# Patient Record
Sex: MALE | Race: WHITE | HISPANIC OR LATINO | Employment: FULL TIME | URBAN - METROPOLITAN AREA
[De-identification: names, ages, dates, MRNs, and addresses within clinical notes are randomized per-mention and may not be internally consistent; named-entity substitution may affect disease eponyms.]

---

## 2022-01-18 ENCOUNTER — LAB VISIT (OUTPATIENT)
Dept: PRIMARY CARE CLINIC | Facility: CLINIC | Age: 37
End: 2022-01-18
Payer: COMMERCIAL

## 2022-01-18 DIAGNOSIS — Z20.822 CONTACT WITH AND (SUSPECTED) EXPOSURE TO COVID-19: ICD-10-CM

## 2022-01-18 LAB
CTP QC/QA: YES
SARS-COV-2 AG RESP QL IA.RAPID: NEGATIVE

## 2022-01-18 PROCEDURE — 87811 SARS-COV-2 COVID19 W/OPTIC: CPT

## 2022-02-02 ENCOUNTER — OFFICE VISIT (OUTPATIENT)
Dept: FAMILY MEDICINE | Facility: CLINIC | Age: 37
End: 2022-02-02
Payer: COMMERCIAL

## 2022-02-02 VITALS
HEART RATE: 66 BPM | DIASTOLIC BLOOD PRESSURE: 68 MMHG | HEIGHT: 75 IN | BODY MASS INDEX: 29.27 KG/M2 | OXYGEN SATURATION: 98 % | WEIGHT: 235.38 LBS | SYSTOLIC BLOOD PRESSURE: 124 MMHG

## 2022-02-02 DIAGNOSIS — Z00.00 GENERAL MEDICAL EXAM: Primary | ICD-10-CM

## 2022-02-02 DIAGNOSIS — C85.90 LYMPHOMA IN REMISSION: ICD-10-CM

## 2022-02-02 PROCEDURE — 1159F MED LIST DOCD IN RCRD: CPT | Mod: CPTII,S$GLB,, | Performed by: FAMILY MEDICINE

## 2022-02-02 PROCEDURE — 3074F PR MOST RECENT SYSTOLIC BLOOD PRESSURE < 130 MM HG: ICD-10-PCS | Mod: CPTII,S$GLB,, | Performed by: FAMILY MEDICINE

## 2022-02-02 PROCEDURE — 99999 PR PBB SHADOW E&M-EST. PATIENT-LVL IV: ICD-10-PCS | Mod: PBBFAC,,, | Performed by: FAMILY MEDICINE

## 2022-02-02 PROCEDURE — 1160F RVW MEDS BY RX/DR IN RCRD: CPT | Mod: CPTII,S$GLB,, | Performed by: FAMILY MEDICINE

## 2022-02-02 PROCEDURE — 99204 OFFICE O/P NEW MOD 45 MIN: CPT | Mod: S$GLB,,, | Performed by: FAMILY MEDICINE

## 2022-02-02 PROCEDURE — 99204 PR OFFICE/OUTPT VISIT, NEW, LEVL IV, 45-59 MIN: ICD-10-PCS | Mod: S$GLB,,, | Performed by: FAMILY MEDICINE

## 2022-02-02 PROCEDURE — 3078F PR MOST RECENT DIASTOLIC BLOOD PRESSURE < 80 MM HG: ICD-10-PCS | Mod: CPTII,S$GLB,, | Performed by: FAMILY MEDICINE

## 2022-02-02 PROCEDURE — 3074F SYST BP LT 130 MM HG: CPT | Mod: CPTII,S$GLB,, | Performed by: FAMILY MEDICINE

## 2022-02-02 PROCEDURE — 99999 PR PBB SHADOW E&M-EST. PATIENT-LVL IV: CPT | Mod: PBBFAC,,, | Performed by: FAMILY MEDICINE

## 2022-02-02 PROCEDURE — 1160F PR REVIEW ALL MEDS BY PRESCRIBER/CLIN PHARMACIST DOCUMENTED: ICD-10-PCS | Mod: CPTII,S$GLB,, | Performed by: FAMILY MEDICINE

## 2022-02-02 PROCEDURE — 3008F BODY MASS INDEX DOCD: CPT | Mod: CPTII,S$GLB,, | Performed by: FAMILY MEDICINE

## 2022-02-02 PROCEDURE — 3078F DIAST BP <80 MM HG: CPT | Mod: CPTII,S$GLB,, | Performed by: FAMILY MEDICINE

## 2022-02-02 PROCEDURE — 1159F PR MEDICATION LIST DOCUMENTED IN MEDICAL RECORD: ICD-10-PCS | Mod: CPTII,S$GLB,, | Performed by: FAMILY MEDICINE

## 2022-02-02 PROCEDURE — 3008F PR BODY MASS INDEX (BMI) DOCUMENTED: ICD-10-PCS | Mod: CPTII,S$GLB,, | Performed by: FAMILY MEDICINE

## 2022-02-02 RX ORDER — LEVOTHYROXINE SODIUM 75 UG/1
75 TABLET ORAL DAILY
COMMUNITY
End: 2022-02-02 | Stop reason: SDUPTHER

## 2022-02-02 RX ORDER — LEVOTHYROXINE SODIUM 75 UG/1
75 TABLET ORAL DAILY
Qty: 90 TABLET | Refills: 1 | Status: SHIPPED | OUTPATIENT
Start: 2022-02-02 | End: 2022-07-15 | Stop reason: SDUPTHER

## 2022-02-02 NOTE — PROGRESS NOTES
[facility] Note    Chief Complaint      Chief Complaint   Patient presents with    Annual Exam    thyroid check     History of Present Illness      Jaden Sepulveda is a 36 y.o. male who presents today with:    Patient is here for f/u on Hypothyroidism and med refills. He has not had bloodwork in over a year.      He also has hx of lymphoma and would like referral to heme/onc.  In remission.      Problem List Items Addressed This Visit    None     Visit Diagnoses     General medical exam    -  Primary    Relevant Orders    CBC Auto Differential    Comprehensive Metabolic Panel    Hemoglobin A1C    Lipid Panel    TSH    Urinalysis    T4, Free    Lymphoma in remission        Relevant Orders    Ambulatory referral/consult to Hematology / Oncology          Health Maintenance   Topic Date Due    Hepatitis C Screening  Never done    Lipid Panel  Never done    TETANUS VACCINE  Never done       History reviewed. No pertinent past medical history.    History reviewed. No pertinent surgical history.    family history is not on file.          Review of Systems   Constitutional: Negative for chills, fever, malaise/fatigue and weight loss.   HENT: Negative for congestion, ear discharge and ear pain.    Eyes: Negative for blurred vision.   Respiratory: Negative for cough and shortness of breath.    Cardiovascular: Negative for chest pain and leg swelling.   Gastrointestinal: Negative for abdominal pain, constipation, diarrhea and vomiting.   Genitourinary: Negative for dysuria.   Musculoskeletal: Negative for myalgias.   Skin: Negative for rash.   Neurological: Negative for dizziness, tingling, focal weakness, weakness and headaches.   Endo/Heme/Allergies: Does not bruise/bleed easily.   Psychiatric/Behavioral: Negative for depression and suicidal ideas.        Outpatient Encounter Medications as of 2/2/2022   Medication Sig Dispense Refill    [DISCONTINUED] levothyroxine (SYNTHROID) 75 MCG tablet Take  "75 mcg by mouth once daily.       No facility-administered encounter medications on file as of 2/2/2022.       Review of patient's allergies indicates:  No Known Allergies    Physical Exam      Vital Signs  Pulse: 66  SpO2: 98 %  BP: 124/68  Pain Score: 0-No pain  Height and Weight  Height: 6' 3" (190.5 cm)  Weight: 106.8 kg (235 lb 6.4 oz)  BSA (Calculated - sq m): 2.38 sq meters  BMI (Calculated): 29.4  Weight in (lb) to have BMI = 25: 199.6]    Physical Exam  Vitals reviewed.   Constitutional:       General: He is not in acute distress.     Appearance: Normal appearance. He is normal weight. He is not ill-appearing.   HENT:      Head: Normocephalic.      Right Ear: Tympanic membrane normal.      Left Ear: Tympanic membrane normal.      Nose: Nose normal.      Mouth/Throat:      Mouth: Mucous membranes are moist.      Pharynx: Oropharynx is clear.   Eyes:      Extraocular Movements: Extraocular movements intact.      Conjunctiva/sclera: Conjunctivae normal.      Pupils: Pupils are equal, round, and reactive to light.   Cardiovascular:      Rate and Rhythm: Normal rate and regular rhythm.      Pulses: Normal pulses.      Heart sounds: Normal heart sounds.   Pulmonary:      Effort: Pulmonary effort is normal.      Breath sounds: Normal breath sounds.   Abdominal:      General: Abdomen is flat. Bowel sounds are normal. There is no distension.      Palpations: Abdomen is soft.      Tenderness: There is no abdominal tenderness.   Musculoskeletal:         General: Normal range of motion.      Cervical back: Normal range of motion and neck supple.   Skin:     General: Skin is warm and dry.      Capillary Refill: Capillary refill takes less than 2 seconds.      Findings: No rash.   Neurological:      General: No focal deficit present.      Mental Status: He is alert and oriented to person, place, and time.      Motor: No weakness.      Gait: Gait normal.   Psychiatric:         Mood and Affect: Mood normal.         " Behavior: Behavior normal.         Thought Content: Thought content normal.         Judgment: Judgment normal.          Laboratory:  CBC:  No results for input(s): WBC, RBC, HGB, HCT, PLT, MCV, MCH, MCHC in the last 2160 hours.  CMP:  No results for input(s): GLU, CALCIUM, ALBUMIN, PROT, NA, K, CO2, CL, BUN, ALKPHOS, ALT, AST, BILITOT in the last 2160 hours.    Invalid input(s): CREATININ  URINALYSIS:  No results for input(s): COLORU, CLARITYU, SPECGRAV, PHUR, PROTEINUA, GLUCOSEU, BILIRUBINCON, BLOODU, WBCU, RBCU, BACTERIA, MUCUS, NITRITE, LEUKOCYTESUR, UROBILINOGEN, HYALINECASTS in the last 2160 hours.   LIPIDS:  No results for input(s): TSH, HDL, CHOL, TRIG, LDLCALC, CHOLHDL, NONHDLCHOL, TOTALCHOLEST in the last 2160 hours.  TSH:  No results for input(s): TSH in the last 2160 hours.  A1C:  No results for input(s): HGBA1C in the last 2160 hours.    Radiology:      Assessment/Plan     Jaden Sepulveda is a 36 y.o.male with:    1. General medical exam  - CBC Auto Differential; Future  - Comprehensive Metabolic Panel; Future  - Hemoglobin A1C; Future  - Lipid Panel; Future  - TSH; Future  - Urinalysis; Future  - T4, Free; Future    2. Lymphoma in remission  - Ambulatory referral/consult to Hematology / Oncology; Future  Chronic conditions stable.  Will continue to monitor.     Follow up as discussed    If symptoms worsen or do not improve return to clinic, go to Urgent Care or ER  Take Medications as directed      -Continue current medications and maintain follow up with specialists.         Donald W Fabacher Jr, MD [facility] [place]

## 2022-04-25 ENCOUNTER — PATIENT MESSAGE (OUTPATIENT)
Dept: FAMILY MEDICINE | Facility: CLINIC | Age: 37
End: 2022-04-25
Payer: COMMERCIAL

## 2022-04-25 NOTE — TELEPHONE ENCOUNTER
Pt would like a B12 lab added to his labs please advise. Pt needs all labs sent to [facility] he would like to get them done Wed.

## 2022-04-26 ENCOUNTER — TELEPHONE (OUTPATIENT)
Dept: FAMILY MEDICINE | Facility: CLINIC | Age: 37
End: 2022-04-26
Payer: COMMERCIAL

## 2022-04-26 DIAGNOSIS — Z00.00 GENERAL MEDICAL EXAM: Primary | ICD-10-CM

## 2022-04-28 LAB
ALBUMIN SERPL-MCNC: 4.4 G/DL (ref 3.6–5.1)
ALBUMIN/GLOB SERPL: 1.6 (CALC) (ref 1–2.5)
ALP SERPL-CCNC: 89 U/L (ref 36–130)
ALT SERPL-CCNC: 31 U/L (ref 9–46)
AST SERPL-CCNC: 24 U/L (ref 10–40)
BASOPHILS # BLD AUTO: 60 CELLS/UL (ref 0–200)
BASOPHILS NFR BLD AUTO: 1 %
BILIRUB SERPL-MCNC: 0.3 MG/DL (ref 0.2–1.2)
BUN SERPL-MCNC: 12 MG/DL (ref 7–25)
BUN/CREAT SERPL: NORMAL (CALC) (ref 6–22)
CALCIUM SERPL-MCNC: 9.7 MG/DL (ref 8.6–10.3)
CHLORIDE SERPL-SCNC: 105 MMOL/L (ref 98–110)
CHOLEST SERPL-MCNC: 164 MG/DL
CHOLEST/HDLC SERPL: 3.8 (CALC)
CO2 SERPL-SCNC: 28 MMOL/L (ref 20–32)
CREAT SERPL-MCNC: 0.85 MG/DL (ref 0.6–1.35)
EOSINOPHIL # BLD AUTO: 588 CELLS/UL (ref 15–500)
EOSINOPHIL NFR BLD AUTO: 9.8 %
ERYTHROCYTE [DISTWIDTH] IN BLOOD BY AUTOMATED COUNT: 13.2 % (ref 11–15)
GLOBULIN SER CALC-MCNC: 2.8 G/DL (CALC) (ref 1.9–3.7)
GLUCOSE SERPL-MCNC: 98 MG/DL (ref 65–99)
HBA1C MFR BLD: 5.1 % OF TOTAL HGB
HCT VFR BLD AUTO: 43.8 % (ref 38.5–50)
HDLC SERPL-MCNC: 43 MG/DL
HGB BLD-MCNC: 13.9 G/DL (ref 13.2–17.1)
LDLC SERPL CALC-MCNC: 99 MG/DL (CALC)
LYMPHOCYTES # BLD AUTO: 1698 CELLS/UL (ref 850–3900)
LYMPHOCYTES NFR BLD AUTO: 28.3 %
MCH RBC QN AUTO: 27.3 PG (ref 27–33)
MCHC RBC AUTO-ENTMCNC: 31.7 G/DL (ref 32–36)
MCV RBC AUTO: 85.9 FL (ref 80–100)
MONOCYTES # BLD AUTO: 660 CELLS/UL (ref 200–950)
MONOCYTES NFR BLD AUTO: 11 %
NEUTROPHILS # BLD AUTO: 2994 CELLS/UL (ref 1500–7800)
NEUTROPHILS NFR BLD AUTO: 49.9 %
NONHDLC SERPL-MCNC: 121 MG/DL (CALC)
PLATELET # BLD AUTO: 231 THOUSAND/UL (ref 140–400)
PMV BLD REES-ECKER: 11 FL (ref 7.5–12.5)
POTASSIUM SERPL-SCNC: 4.2 MMOL/L (ref 3.5–5.3)
PROT SERPL-MCNC: 7.2 G/DL (ref 6.1–8.1)
RBC # BLD AUTO: 5.1 MILLION/UL (ref 4.2–5.8)
SODIUM SERPL-SCNC: 141 MMOL/L (ref 135–146)
T4 FREE SERPL-MCNC: 1.3 NG/DL (ref 0.8–1.8)
TRIGL SERPL-MCNC: 122 MG/DL
TSH SERPL-ACNC: 1.2 MIU/L (ref 0.4–4.5)
VIT B12 SERPL-MCNC: 1347 PG/ML (ref 200–1100)
WBC # BLD AUTO: 6 THOUSAND/UL (ref 3.8–10.8)

## 2022-05-13 NOTE — PROGRESS NOTES
"PATIENT: Jaden Sepulveda .  MRN: [id number]  DATE: 5/16/2022    Diagnosis:   1. History of Hodgkin's lymphoma    2. History of chemotherapy    3. 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 Sepulveda is a 37 y.o. male who presents for evaluation and management of Hodgkin lymphoma.    - he had previously seen Dr. Amador. Information per his note dated 5/11/21:  "h/o HL treated in 2012.  INTERVAL 5/11/2021  Mr. Sepulveda returns today for scheduled follow-up. He reports that he has been feeling in his usual state of health. He denies any health issues over the past year. He denies any night sweats or fevers or chills. He denies any lymphadenopathy. His appetite and weight have been stable. He continues to work full-time in his functional status is excellent.    Dr. Mcintyre's onc hx copied from previous note  This 33 y/o  male noted a left supraclavicular mass in 2012. The mass grew larger and he was sent by his PCP to a aurgeon who biopsied the node. It was nodular sclerosing Hodgkins" . Bilateral bone marrows were negative . However he had adenopathy in the left cervical, mediastinal , axillary, left inguinal, femoral nodes and bilateral lung nodules. The patient had 6 months (12 treatments ) of AVBD completing all in 10/2012. Subsequent scans showed the lung nodules were not pet avid but remained and no remaining adenopathy .  Abdominal and pelvic ultrasound and chest xray 4/2019 were negative"    - today, he is doing well. He denies shortness of breath, chest pain, nausea, vomiting, diarrhea, constipation.    Past medical, surgical, family, and social " "histories have been reviewed and updated below.    Past Medical History: No past medical history on file.    Past Surgical History: No past surgical history on file.    Family History: No family history on file.    Social History:      Allergies:  Review of patient's allergies indicates:  No Known Allergies    Medications:  Current Outpatient Medications   Medication Sig Dispense Refill    levothyroxine (SYNTHROID) 75 MCG tablet Take 1 tablet (75 mcg total) by mouth once daily. 90 tablet 1     No current facility-administered medications for this visit.       Review of Systems   Constitutional: Negative for appetite change and unexpected weight change.   HENT: Negative for mouth sores.    Eyes: Negative for visual disturbance.   Respiratory: Negative for cough and shortness of breath.    Cardiovascular: Negative for chest pain.   Gastrointestinal: Negative for abdominal pain and diarrhea.   Genitourinary: Negative for frequency.   Musculoskeletal: Negative for back pain.   Skin: Negative for rash.   Neurological: Negative for headaches.   Hematological: Negative for adenopathy.   Psychiatric/Behavioral: The patient is not nervous/anxious.        ECOG Performance Status:   ECOG SCORE    0 - Fully active-able to carry on all pre-disease performance without restriction         Objective:      Vitals:   Vitals:    05/16/22 1509   BP: 119/73   BP Location: Right arm   Patient Position: Sitting   BP Method: Medium (Automatic)   Pulse: 70   Resp: 18   Temp: 98.2 °F (36.8 °C)   TempSrc: Oral   SpO2: 98%   Weight: 101.8 kg (224 lb 6.9 oz)   Height: 6' 3" (1.905 m)     BMI: Body mass index is 28.05 kg/m².    Physical Exam  Vitals and nursing note reviewed.   Constitutional:       Appearance: He is well-developed.   HENT:      Head: Normocephalic and atraumatic.   Eyes:      Pupils: Pupils are equal, round, and reactive to light.   Cardiovascular:      Rate and Rhythm: Normal rate and regular rhythm.   Pulmonary:      Effort: " "Pulmonary effort is normal.      Breath sounds: Normal breath sounds.   Chest:   Breasts:      Right: No axillary adenopathy or supraclavicular adenopathy.      Left: No axillary adenopathy or supraclavicular adenopathy.       Abdominal:      General: Bowel sounds are normal.      Palpations: Abdomen is soft.   Musculoskeletal:         General: Normal range of motion.      Cervical back: Normal range of motion and neck supple.   Lymphadenopathy:      Cervical: No cervical adenopathy.      Upper Body:      Right upper body: No supraclavicular or axillary adenopathy.      Left upper body: No supraclavicular or axillary adenopathy.   Skin:     General: Skin is warm and dry.   Neurological:      Mental Status: He is alert and oriented to person, place, and time.   Psychiatric:         Behavior: Behavior normal.         Thought Content: Thought content normal.         Judgment: Judgment normal.         Laboratory Data:  Labs have been reviewed.    Lab Results   Component Value Date    WBC 6.0 04/27/2022    HGB 13.9 04/27/2022    HCT 43.8 04/27/2022    MCV 85.9 04/27/2022     04/27/2022           Imaging:    Assessment:       1. History of Hodgkin's lymphoma    2. History of chemotherapy    3. Advance care planning           Plan:     1. History of Hodgkin lymphoma  - I have reviewed his outside records.  - diagnosed in 2012 from a left supraclavicular mass. Pathology revealed nodular sclerosing Hodgkins.  - bone marrow biopsy was negative.  - imaging revealed "in the left cervical, mediastinal , axillary, left inguinal, femoral nodes and bilateral lung nodules."  - he completed 12 treatments of ABVD chemotherapy. He completed therapy in October 2012.  - follow-up scans were negative for adenopathy. Pulmonary nodules remained but were not PET-avid.      - reviewed his labs.  - clinically, there is no evidence for recurrent lymphoma  - return to clinic in one year.    2. Advance Care Planning     Date: " 05/16/2022    Power of   I initiated the process of advance care planning today and explained the importance of this process to the patient.  I introduced the concept of advance directives to the patient, as well. Then the patient received detailed information about the importance of designating a Health Care Power of  (HCPOA). He was also instructed to communicate with this person about their wishes for future healthcare, should he become sick and lose decision-making capacity. The patient has not previously appointed a HCPOA. After our discussion, the patient has decided to complete a HCPOA and has appointed his wife Meghan Sepulveda ([phone]). I spent a total time of 16 minutes discussing this issue with the patient.          - return to clinic in one year.      Brian Whittington M.D.  Hematology/Oncology  [facility] - [address], [address]  [place] [address]  Phone: [phone]  Fax: [phone]

## 2022-05-16 ENCOUNTER — OFFICE VISIT (OUTPATIENT)
Dept: HEMATOLOGY/ONCOLOGY | Facility: CLINIC | Age: 37
End: 2022-05-16
Payer: COMMERCIAL

## 2022-05-16 VITALS
SYSTOLIC BLOOD PRESSURE: 119 MMHG | HEART RATE: 70 BPM | RESPIRATION RATE: 18 BRPM | OXYGEN SATURATION: 98 % | TEMPERATURE: 98 F | DIASTOLIC BLOOD PRESSURE: 73 MMHG | BODY MASS INDEX: 27.9 KG/M2 | HEIGHT: 75 IN | WEIGHT: 224.44 LBS

## 2022-05-16 DIAGNOSIS — Z85.71 HISTORY OF HODGKIN'S LYMPHOMA: Primary | ICD-10-CM

## 2022-05-16 DIAGNOSIS — Z71.89 ADVANCE CARE PLANNING: ICD-10-CM

## 2022-05-16 DIAGNOSIS — Z92.21 HISTORY OF CHEMOTHERAPY: ICD-10-CM

## 2022-05-16 PROCEDURE — 3008F PR BODY MASS INDEX (BMI) DOCUMENTED: ICD-10-PCS | Mod: CPTII,S$GLB,, | Performed by: INTERNAL MEDICINE

## 2022-05-16 PROCEDURE — 99204 PR OFFICE/OUTPT VISIT, NEW, LEVL IV, 45-59 MIN: ICD-10-PCS | Mod: S$GLB,,, | Performed by: INTERNAL MEDICINE

## 2022-05-16 PROCEDURE — 99999 PR PBB SHADOW E&M-EST. PATIENT-LVL III: CPT | Mod: PBBFAC,,, | Performed by: INTERNAL MEDICINE

## 2022-05-16 PROCEDURE — 1159F MED LIST DOCD IN RCRD: CPT | Mod: CPTII,S$GLB,, | Performed by: INTERNAL MEDICINE

## 2022-05-16 PROCEDURE — 1160F PR REVIEW ALL MEDS BY PRESCRIBER/CLIN PHARMACIST DOCUMENTED: ICD-10-PCS | Mod: CPTII,S$GLB,, | Performed by: INTERNAL MEDICINE

## 2022-05-16 PROCEDURE — 99204 OFFICE O/P NEW MOD 45 MIN: CPT | Mod: S$GLB,,, | Performed by: INTERNAL MEDICINE

## 2022-05-16 PROCEDURE — 3078F DIAST BP <80 MM HG: CPT | Mod: CPTII,S$GLB,, | Performed by: INTERNAL MEDICINE

## 2022-05-16 PROCEDURE — 99497 ADVNCD CARE PLAN 30 MIN: CPT | Mod: S$GLB,,, | Performed by: INTERNAL MEDICINE

## 2022-05-16 PROCEDURE — 1160F RVW MEDS BY RX/DR IN RCRD: CPT | Mod: CPTII,S$GLB,, | Performed by: INTERNAL MEDICINE

## 2022-05-16 PROCEDURE — 99497 PR ADVNCD CARE PLAN 30 MIN: ICD-10-PCS | Mod: S$GLB,,, | Performed by: INTERNAL MEDICINE

## 2022-05-16 PROCEDURE — 1159F PR MEDICATION LIST DOCUMENTED IN MEDICAL RECORD: ICD-10-PCS | Mod: CPTII,S$GLB,, | Performed by: INTERNAL MEDICINE

## 2022-05-16 PROCEDURE — 3044F HG A1C LEVEL LT 7.0%: CPT | Mod: CPTII,S$GLB,, | Performed by: INTERNAL MEDICINE

## 2022-05-16 PROCEDURE — 3074F PR MOST RECENT SYSTOLIC BLOOD PRESSURE < 130 MM HG: ICD-10-PCS | Mod: CPTII,S$GLB,, | Performed by: INTERNAL MEDICINE

## 2022-05-16 PROCEDURE — 3078F PR MOST RECENT DIASTOLIC BLOOD PRESSURE < 80 MM HG: ICD-10-PCS | Mod: CPTII,S$GLB,, | Performed by: INTERNAL MEDICINE

## 2022-05-16 PROCEDURE — 3044F PR MOST RECENT HEMOGLOBIN A1C LEVEL <7.0%: ICD-10-PCS | Mod: CPTII,S$GLB,, | Performed by: INTERNAL MEDICINE

## 2022-05-16 PROCEDURE — 3008F BODY MASS INDEX DOCD: CPT | Mod: CPTII,S$GLB,, | Performed by: INTERNAL MEDICINE

## 2022-05-16 PROCEDURE — 3074F SYST BP LT 130 MM HG: CPT | Mod: CPTII,S$GLB,, | Performed by: INTERNAL MEDICINE

## 2022-05-16 PROCEDURE — 99999 PR PBB SHADOW E&M-EST. PATIENT-LVL III: ICD-10-PCS | Mod: PBBFAC,,, | Performed by: INTERNAL MEDICINE

## 2022-07-15 ENCOUNTER — PATIENT MESSAGE (OUTPATIENT)
Dept: HEMATOLOGY/ONCOLOGY | Facility: CLINIC | Age: 37
End: 2022-07-15
Payer: COMMERCIAL

## 2022-07-15 DIAGNOSIS — E03.9 ACQUIRED HYPOTHYROIDISM: Primary | ICD-10-CM

## 2022-07-15 RX ORDER — LEVOTHYROXINE SODIUM 75 UG/1
75 TABLET ORAL DAILY
Qty: 90 TABLET | Refills: 3 | Status: SHIPPED | OUTPATIENT
Start: 2022-07-15 | End: 2023-07-03

## 2022-08-12 ENCOUNTER — TELEPHONE (OUTPATIENT)
Dept: FAMILY MEDICINE | Facility: CLINIC | Age: 37
End: 2022-08-12
Payer: COMMERCIAL

## 2022-08-12 NOTE — TELEPHONE ENCOUNTER
Called pt no answer, left message regarding getting rescheduled to est care with an available provider.

## 2023-05-15 ENCOUNTER — PATIENT MESSAGE (OUTPATIENT)
Dept: HEMATOLOGY/ONCOLOGY | Facility: CLINIC | Age: 38
End: 2023-05-15
Payer: COMMERCIAL

## 2023-07-03 DIAGNOSIS — E03.9 ACQUIRED HYPOTHYROIDISM: ICD-10-CM

## 2023-07-03 RX ORDER — LEVOTHYROXINE SODIUM 75 UG/1
TABLET ORAL
Qty: 90 TABLET | Refills: 3 | Status: SHIPPED | OUTPATIENT
Start: 2023-07-03

## 2023-10-03 ENCOUNTER — PATIENT MESSAGE (OUTPATIENT)
Dept: ADMINISTRATIVE | Facility: HOSPITAL | Age: 38
End: 2023-10-03
Payer: COMMERCIAL

## 2023-11-21 ENCOUNTER — PATIENT MESSAGE (OUTPATIENT)
Dept: ADMINISTRATIVE | Facility: HOSPITAL | Age: 38
End: 2023-11-21
Payer: COMMERCIAL

## 2024-01-07 ENCOUNTER — PATIENT MESSAGE (OUTPATIENT)
Dept: HEMATOLOGY/ONCOLOGY | Facility: CLINIC | Age: 39
End: 2024-01-07
Payer: COMMERCIAL

## 2024-01-28 PROBLEM — C85.90 LYMPHOMA IN REMISSION: Status: ACTIVE | Noted: 2024-01-28

## 2024-01-28 PROBLEM — C85.9A LYMPHOMA IN REMISSION: Status: ACTIVE | Noted: 2024-01-28

## 2024-01-28 NOTE — PROGRESS NOTES
"PATIENT: Jaden Sepulveda Sr.  MRN: [id number]  DATE: 1/31/2024    Diagnosis:   1. History of Hodgkin's lymphoma    2. Lymphoma in remission    3. 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 Sepulveda is a 38 y.o. male who presented in May 2022 for evaluation and management of Hodgkin lymphoma.    - he had previously seen Dr. Amador. Information per his note dated 5/11/21:  "h/o HL treated in 2012.  INTERVAL 5/11/2021  Mr. Sepulveda returns today for scheduled follow-up. He reports that he has been feeling in his usual state of health. He denies any health issues over the past year. He denies any night sweats or fevers or chills. He denies any lymphadenopathy. His appetite and weight have been stable. He continues to work full-time in his functional status is excellent.    Dr. Mcintyre's onc hx copied from previous note  This 33 y/o  male noted a left supraclavicular mass in 2012. The mass grew larger and he was sent by his PCP to a montyon who biopsied the node. It was nodular sclerosing Hodgkins" . Bilateral bone marrows were negative . However he had adenopathy in the left cervical, mediastinal , axillary, left inguinal, femoral nodes and bilateral lung nodules. The patient had 6 months (12 treatments ) of AVBD completing all in 10/2012. Subsequent scans showed the lung nodules were not pet avid but remained and no remaining adenopathy .  Abdominal and pelvic ultrasound and chest xray 4/2019 were negative"    Interval history:  - he presents for a follow-up appointment for his history of lymphoma.  - today, he is doing well. He denies shortness of " breath, chest pain, nausea, vomiting, diarrhea, constipation. He notes tingling pain under his right shoulder blade.    Past medical, surgical, family, and social histories have been reviewed and updated below.    Past Medical History: No past medical history on file.    Past Surgical History: No past surgical history on file.    Family History: No family history on file.    Social History:      Allergies:  Review of patient's allergies indicates:  No Known Allergies    Medications:  Current Outpatient Medications   Medication Sig Dispense Refill    levothyroxine (SYNTHROID) 75 MCG tablet Take 1 tablet by mouth once daily 90 tablet 3     No current facility-administered medications for this visit.       Review of Systems   Constitutional:  Negative for appetite change and unexpected weight change.   HENT:  Negative for mouth sores.    Eyes:  Negative for visual disturbance.   Respiratory:  Negative for cough and shortness of breath.    Cardiovascular:  Negative for chest pain.   Gastrointestinal:  Negative for abdominal pain and diarrhea.   Genitourinary:  Negative for frequency.   Musculoskeletal:  Negative for back pain.   Skin:  Negative for rash.   Neurological:  Negative for headaches.   Hematological:  Negative for adenopathy.   Psychiatric/Behavioral:  The patient is not nervous/anxious.        ECOG Performance Status:   ECOG SCORE    0 - Fully active-able to carry on all pre-disease performance without restriction         Objective:      Vitals:   Vitals:    01/31/24 1435   BP: 114/68   Pulse: 70   SpO2: 97%   Weight: 97.5 kg (214 lb 15.2 oz)     BMI: Body mass index is 26.87 kg/m².    Physical Exam  Vitals and nursing note reviewed.   Constitutional:       Appearance: He is well-developed.   HENT:      Head: Normocephalic and atraumatic.   Eyes:      Pupils: Pupils are equal, round, and reactive to light.   Cardiovascular:      Rate and Rhythm: Normal rate and regular rhythm.   Pulmonary:      Effort:  "Pulmonary effort is normal.      Breath sounds: Normal breath sounds.   Abdominal:      General: Bowel sounds are normal.      Palpations: Abdomen is soft.   Musculoskeletal:         General: Normal range of motion.      Cervical back: Normal range of motion and neck supple.   Lymphadenopathy:      Cervical: No cervical adenopathy.      Upper Body:      Right upper body: No supraclavicular or axillary adenopathy.      Left upper body: No supraclavicular or axillary adenopathy.   Skin:     General: Skin is warm and dry.   Neurological:      Mental Status: He is alert and oriented to person, place, and time.   Psychiatric:         Behavior: Behavior normal.         Thought Content: Thought content normal.         Judgment: Judgment normal.         Laboratory Data:  Labs have been reviewed.    Lab Results   Component Value Date    WBC 6.0 04/27/2022    HGB 13.9 04/27/2022    HCT 43.8 04/27/2022    MCV 85.9 04/27/2022     04/27/2022           Imaging:    Assessment:       1. History of Hodgkin's lymphoma    2. Lymphoma in remission    3. History of chemotherapy           Plan:     1. History of Hodgkin lymphoma  - I have reviewed his outside records.  - diagnosed in 2012 from a left supraclavicular mass. Pathology revealed nodular sclerosing Hodgkins.  - bone marrow biopsy was negative.  - imaging revealed "in the left cervical, mediastinal , axillary, left inguinal, femoral nodes and bilateral lung nodules."  - he completed 12 treatments of ABVD chemotherapy. He completed therapy in October 2012.  - follow-up scans were negative for adenopathy. Pulmonary nodules remained but were not PET-avid.        - clinically, there is no evidence for recurrent lymphoma  - Labs have been reviewed.  - Normal uric acid and lactate dehydrogenase.  - return to clinic in one year.    2. Advance Care Planning     Power of   After our discussion (at previous visit), the patient decided to complete a HCPOA and appointed his  " wife Meghan Sepulveda ([phone]) .           - return to clinic in one year.      Brian Whittington M.D.  Hematology/Oncology  [facility] - [address], [address]  [place] [address]  Phone: [phone]  Fax: [phone]

## 2024-01-31 ENCOUNTER — OFFICE VISIT (OUTPATIENT)
Dept: HEMATOLOGY/ONCOLOGY | Facility: CLINIC | Age: 39
End: 2024-01-31
Payer: COMMERCIAL

## 2024-01-31 VITALS
OXYGEN SATURATION: 97 % | HEART RATE: 70 BPM | SYSTOLIC BLOOD PRESSURE: 114 MMHG | WEIGHT: 214.94 LBS | DIASTOLIC BLOOD PRESSURE: 68 MMHG | BODY MASS INDEX: 26.87 KG/M2

## 2024-01-31 DIAGNOSIS — Z92.21 HISTORY OF CHEMOTHERAPY: ICD-10-CM

## 2024-01-31 DIAGNOSIS — C85.90 LYMPHOMA IN REMISSION: ICD-10-CM

## 2024-01-31 DIAGNOSIS — Z85.71 HISTORY OF HODGKIN'S LYMPHOMA: Primary | ICD-10-CM

## 2024-01-31 PROCEDURE — 3074F SYST BP LT 130 MM HG: CPT | Mod: CPTII,S$GLB,, | Performed by: INTERNAL MEDICINE

## 2024-01-31 PROCEDURE — 99214 OFFICE O/P EST MOD 30 MIN: CPT | Mod: S$GLB,,, | Performed by: INTERNAL MEDICINE

## 2024-01-31 PROCEDURE — 1159F MED LIST DOCD IN RCRD: CPT | Mod: CPTII,S$GLB,, | Performed by: INTERNAL MEDICINE

## 2024-01-31 PROCEDURE — 1160F RVW MEDS BY RX/DR IN RCRD: CPT | Mod: CPTII,S$GLB,, | Performed by: INTERNAL MEDICINE

## 2024-01-31 PROCEDURE — 3078F DIAST BP <80 MM HG: CPT | Mod: CPTII,S$GLB,, | Performed by: INTERNAL MEDICINE

## 2024-01-31 PROCEDURE — 99999 PR PBB SHADOW E&M-EST. PATIENT-LVL III: CPT | Mod: PBBFAC,,, | Performed by: INTERNAL MEDICINE

## 2024-01-31 PROCEDURE — 3008F BODY MASS INDEX DOCD: CPT | Mod: CPTII,S$GLB,, | Performed by: INTERNAL MEDICINE

## 2024-02-05 ENCOUNTER — OFFICE VISIT (OUTPATIENT)
Dept: INTERNAL MEDICINE | Facility: CLINIC | Age: 39
End: 2024-02-05
Payer: COMMERCIAL

## 2024-02-05 VITALS
DIASTOLIC BLOOD PRESSURE: 70 MMHG | HEART RATE: 74 BPM | WEIGHT: 219.56 LBS | BODY MASS INDEX: 27.3 KG/M2 | HEIGHT: 75 IN | OXYGEN SATURATION: 98 % | SYSTOLIC BLOOD PRESSURE: 120 MMHG

## 2024-02-05 DIAGNOSIS — R20.0 NUMBNESS AND TINGLING: ICD-10-CM

## 2024-02-05 DIAGNOSIS — Z76.89 ENCOUNTER TO ESTABLISH CARE WITH NEW DOCTOR: ICD-10-CM

## 2024-02-05 DIAGNOSIS — E03.9 ACQUIRED HYPOTHYROIDISM: ICD-10-CM

## 2024-02-05 DIAGNOSIS — B36.0 TINEA VERSICOLOR: ICD-10-CM

## 2024-02-05 DIAGNOSIS — Z00.00 ENCOUNTER FOR ANNUAL GENERAL MEDICAL EXAMINATION WITHOUT ABNORMAL FINDINGS IN ADULT: Primary | ICD-10-CM

## 2024-02-05 DIAGNOSIS — Z23 NEED FOR VACCINATION: ICD-10-CM

## 2024-02-05 DIAGNOSIS — R20.2 NUMBNESS AND TINGLING: ICD-10-CM

## 2024-02-05 PROBLEM — C81.90: Status: ACTIVE | Noted: 2019-04-02

## 2024-02-05 PROBLEM — L21.0: Status: RESOLVED | Noted: 2019-04-02 | Resolved: 2024-02-05

## 2024-02-05 PROCEDURE — 3074F SYST BP LT 130 MM HG: CPT | Mod: CPTII,S$GLB,, | Performed by: FAMILY MEDICINE

## 2024-02-05 PROCEDURE — 99385 PREV VISIT NEW AGE 18-39: CPT | Mod: 1E,GY,S$GLB, | Performed by: FAMILY MEDICINE

## 2024-02-05 PROCEDURE — 1160F RVW MEDS BY RX/DR IN RCRD: CPT | Mod: CPTII,S$GLB,, | Performed by: FAMILY MEDICINE

## 2024-02-05 PROCEDURE — 3078F DIAST BP <80 MM HG: CPT | Mod: CPTII,S$GLB,, | Performed by: FAMILY MEDICINE

## 2024-02-05 PROCEDURE — 99999 PR PBB SHADOW E&M-EST. PATIENT-LVL IV: CPT | Mod: PBBFAC,,, | Performed by: FAMILY MEDICINE

## 2024-02-05 PROCEDURE — 1159F MED LIST DOCD IN RCRD: CPT | Mod: CPTII,S$GLB,, | Performed by: FAMILY MEDICINE

## 2024-02-05 PROCEDURE — 99203 OFFICE O/P NEW LOW 30 MIN: CPT | Mod: 25,S$GLB,, | Performed by: FAMILY MEDICINE

## 2024-02-05 PROCEDURE — 3008F BODY MASS INDEX DOCD: CPT | Mod: CPTII,S$GLB,, | Performed by: FAMILY MEDICINE

## 2024-02-05 RX ORDER — KETOCONAZOLE 20 MG/ML
SHAMPOO, SUSPENSION TOPICAL DAILY
Qty: 120 ML | Refills: 2 | Status: SHIPPED | OUTPATIENT
Start: 2024-02-05 | End: 2024-02-08

## 2024-02-05 RX ORDER — KETOCONAZOLE 20 MG/G
CREAM TOPICAL DAILY
Qty: 60 G | Refills: 1 | Status: SHIPPED | OUTPATIENT
Start: 2024-02-05 | End: 2024-02-19

## 2024-02-05 NOTE — PROGRESS NOTES
Subjective:     Patient ID: Jaden Sepulveda Sr. is a 38 y.o. male.   Chief Complaint: Establish Care    HPI:  Patient presents to establish [facility].  Patient is due for annual exam and labs.    Hx Hodgkin's disease, now in remission since 2012. Continues to f/u Heme/Onc, last visit 1/31/24. No ongoing issues.    Reports 3-4 years ago he was stretching his neck and felt a painful pop. Upon evaluation by CT performed at [facility], he was told he had a slipped disc in his neck. Those records are not available in this system.    Reports over the past few months he has been having some minor self-limited tingling below the right shoulder blade.    Hx tinea versicolor treated with a steroid cream in the past. Says this didn't help much. The rash has returned about a week ago.    Also c/o scratchy throat x2-3 days. No coughing, no fevers/chills. No GI sx. Has not taken anything for sx yet.    Review of Problems & History:  Patient Active Problem List   Diagnosis    Hodgkin's disease in adult    Acquired hypothyroidism      Past Medical History:   Diagnosis Date    Pityriasis in adult 04/02/2019      History reviewed. No pertinent surgical history.   Social History     Socioeconomic History    Marital status:    Tobacco Use    Smoking status: Never    Smokeless tobacco: Never   Substance and Sexual Activity    Alcohol use: Not Currently    Drug use: Not Currently    Sexual activity: Yes     Partners: Female     Birth control/protection: OCP     Social Determinants of Health     Financial Resource Strain: Patient Declined (1/29/2024)    Overall Financial Resource Strain (CARDIA)     Difficulty of Paying Living Expenses: Patient declined   Food Insecurity: Patient Declined (1/29/2024)    Hunger Vital Sign     Worried About Running Out of Food in the Last Year: Patient declined     Ran Out of Food in the Last Year: Patient declined   Transportation Needs: Patient Declined (1/29/2024)    PRAPARE - Transportation      Lack of Transportation (Medical): Patient declined     Lack of Transportation (Non-Medical): Patient declined   Physical Activity: Sufficiently Active (1/29/2024)    Exercise Vital Sign     Days of Exercise per Week: 7 days     Minutes of Exercise per Session: 60 min   Stress: No Stress Concern Present (1/29/2024)    [employer] [place] [employer] - Occupational Stress Questionnaire     Feeling of Stress : Not at all   Social Connections: Unknown (1/29/2024)    Social Connection and Isolation Panel [NHANES]     Frequency of Communication with Friends and Family: Patient declined     Frequency of Social Gatherings with Friends and Family: Patient declined     Active Member of Clubs or Organizations: Patient declined     Attends Club or Organization Meetings: Patient declined     Marital Status:    Housing Stability: Unknown (1/29/2024)    Housing Stability Vital Sign     Unable to Pay for Housing in the Last Year: Patient declined     Number of Places Lived in the Last Year: 1     Unstable Housing in the Last Year: No        Medication Review:    Current Outpatient Medications:     ketoconazole (NIZORAL) 2 % cream, Apply topically once daily. for 14 days, Disp: 60 g, Rfl: 1    ketoconazole (NIZORAL) 2 % shampoo, Apply topically once daily. for 3 days, Disp: 120 mL, Rfl: 2    levothyroxine (SYNTHROID) 75 MCG tablet, Take 1 tablet by mouth once daily, Disp: 90 tablet, Rfl: 3     Review of Systems   Constitutional:  Negative for chills, fatigue and fever.   HENT:  Positive for sore throat. Negative for nasal congestion, ear pain and postnasal drip.    Eyes:  Negative for visual disturbance.   Respiratory:  Negative for cough, shortness of breath and wheezing.    Cardiovascular:  Negative for chest pain and palpitations.   Gastrointestinal:  Negative for abdominal pain, change in bowel habit, constipation, diarrhea, nausea and vomiting.   Genitourinary:  Negative for dysuria and hematuria.  "  Musculoskeletal:  Negative for back pain, leg pain and neck pain.   Integumentary:  Positive for rash.   Neurological:  Positive for numbness. Negative for dizziness and headaches.   Hematological:  Negative for adenopathy.   Psychiatric/Behavioral:  Negative for dysphoric mood, sleep disturbance and suicidal ideas. The patient is not nervous/anxious.           Objective:      Vitals:    02/05/24 1501   BP: 120/70   BP Location: Right arm   Patient Position: Sitting   BP Method: Medium (Manual)   Pulse: 74   SpO2: 98%   Weight: 99.6 kg (219 lb 9.3 oz)   Height: 6' 3" (1.905 m)      Physical Exam  Vitals and nursing note reviewed.   Constitutional:       General: He is not in acute distress.     Appearance: Normal appearance. He is not ill-appearing.   HENT:      Head: Normocephalic and atraumatic.      Right Ear: Tympanic membrane, ear canal and external ear normal. There is no impacted cerumen.      Left Ear: Tympanic membrane, ear canal and external ear normal. There is no impacted cerumen.      Nose: Nose normal. No congestion or rhinorrhea.      Mouth/Throat:      Mouth: Mucous membranes are moist.      Pharynx: Oropharynx is clear. No oropharyngeal exudate or posterior oropharyngeal erythema.   Eyes:      General: No scleral icterus.        Right eye: No discharge.         Left eye: No discharge.      Conjunctiva/sclera: Conjunctivae normal.   Neck:      Thyroid: No thyroid mass, thyromegaly or thyroid tenderness.   Cardiovascular:      Rate and Rhythm: Normal rate and regular rhythm.      Pulses: Normal pulses.      Heart sounds: Normal heart sounds. No murmur heard.     No friction rub. No gallop.   Pulmonary:      Effort: Pulmonary effort is normal. No respiratory distress.      Breath sounds: Normal breath sounds. No wheezing, rhonchi or rales.   Abdominal:      General: Abdomen is flat. Bowel sounds are normal. There is no distension.      Palpations: Abdomen is soft. There is no mass.      Tenderness: " There is no abdominal tenderness. There is no guarding.   Musculoskeletal:         General: No swelling or deformity. Normal range of motion.      Cervical back: Normal range of motion and neck supple. No tenderness.        Back:    Lymphadenopathy:      Cervical: No cervical adenopathy.   Skin:     General: Skin is warm and dry.      Findings: Rash present. No ecchymosis or petechiae. Rash is papular. Rash is not crusting, macular or vesicular.          Neurological:      General: No focal deficit present.      Mental Status: He is alert and oriented to person, place, and time. Mental status is at baseline.      Gait: Gait normal.      Deep Tendon Reflexes: Reflexes normal.   Psychiatric:         Mood and Affect: Mood normal.         Behavior: Behavior normal.         Thought Content: Thought content normal.         Judgment: Judgment normal.           Assessment:       Problem List Items Addressed This Visit          Endocrine    Acquired hypothyroidism    Relevant Orders    TSH    T4, Free     Other Visit Diagnoses       Encounter for annual general medical examination without abnormal findings in adult    -  Primary    Relevant Orders    Comprehensive Metabolic Panel    CBC Auto Differential    Lipid Panel    Hemoglobin A1C    TSH    T4, Free    Hepatitis C Antibody    HIV 1/2 Ag/Ab (4th Gen)    Encounter to establish care with new doctor        Need for vaccination        Tinea versicolor        Relevant Medications    ketoconazole (NIZORAL) 2 % shampoo    ketoconazole (NIZORAL) 2 % cream              Plan:       1. Encounter for annual general medical examination without abnormal findings in adult  Health maintenance updated  Chronic issues reviewed  Fasting labs ordered - some have been completed by another provider but results have not populated  - Comprehensive Metabolic Panel; Future  - CBC Auto Differential; Future  - Lipid Panel; Future  - Hemoglobin A1C; Future  - TSH; Future  - T4, Free; Future  -  Hepatitis C Antibody; Future  - HIV 1/2 Ag/Ab (4th Gen); Future    2. Encounter to establish care with new doctor  Reviewed chronic medical conditions, updated problem list and medication list    3. Need for vaccination  Influenza declined  COVID declined    4. Acquired hypothyroidism  Stable, no refill of synthroid needed today  Checking levels  - TSH; Future  - T4, Free; Future    5. Tinea versicolor  Exam c/w tinea  Rx ketoconazole shampoo and cream  RTC for failure to improve  - ketoconazole (NIZORAL) 2 % shampoo; Apply topically once daily. for 3 days  Dispense: 120 mL; Refill: 2  - ketoconazole (NIZORAL) 2 % cream; Apply topically once daily. for 14 days  Dispense: 60 g; Refill: 1    6. Numbness and tingling  No emergent findings, though etiology is unclear  Location of tingling is too low to be associated with C spine disc pathology  F/u his prior results, consider new imaging        Follow up in about 1 year (around 2/5/2025) for Annual Visit, Fasting labs.     Joce Young MD, FAAFP  Family Medicine Physician  [facility]  02/05/2024

## 2024-03-05 ENCOUNTER — PATIENT MESSAGE (OUTPATIENT)
Dept: INTERNAL MEDICINE | Facility: CLINIC | Age: 39
End: 2024-03-05
Payer: COMMERCIAL

## 2024-03-08 ENCOUNTER — OFFICE VISIT (OUTPATIENT)
Dept: URGENT CARE | Facility: CLINIC | Age: 39
End: 2024-03-08
Payer: OTHER MISCELLANEOUS

## 2024-03-08 VITALS
OXYGEN SATURATION: 100 % | WEIGHT: 219 LBS | HEART RATE: 67 BPM | HEIGHT: 75 IN | DIASTOLIC BLOOD PRESSURE: 76 MMHG | BODY MASS INDEX: 27.23 KG/M2 | RESPIRATION RATE: 19 BRPM | SYSTOLIC BLOOD PRESSURE: 114 MMHG

## 2024-03-08 DIAGNOSIS — S62.662A CLOSED NONDISPLACED FRACTURE OF DISTAL PHALANX OF RIGHT MIDDLE FINGER, INITIAL ENCOUNTER: Primary | ICD-10-CM

## 2024-03-08 DIAGNOSIS — S67.192A CRUSHING INJURY OF RIGHT MIDDLE FINGER, INITIAL ENCOUNTER: ICD-10-CM

## 2024-03-08 DIAGNOSIS — Z02.6 ENCOUNTER RELATED TO WORKER'S COMPENSATION CLAIM: ICD-10-CM
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
## 2024-03-08 PROCEDURE — 73140 X-RAY EXAM OF FINGER(S): CPT | Mod: RT,S$GLB,, | Performed by: RADIOLOGY

## 2024-03-08 PROCEDURE — 99203 OFFICE O/P NEW LOW 30 MIN: CPT | Mod: S$GLB,,, | Performed by: PHYSICIAN ASSISTANT

## 2024-03-08 NOTE — LETTER
[facility] Occupational Health  [address] [address]  Phone: [phone]  Fax: [phone]  [facility]: [email or web address]    Pt Name: Jaden Sepulveda Sr.  Injury Date: 03/07/2024   Employee ID: [id number] Date of First Treatment: 03/08/2024   Company: [employer]      Appointment Time:  Arrived: 9:32 AM   Provider: Johnny Shaw PA-C Time Out:10: 28 AM     Office Treatment:   1. Closed nondisplaced fracture of distal phalanx of right middle finger, initial encounter    2. Encounter related to worker's compensation claim    3. Crushing injury of right middle finger, initial encounter    4. Work related injury          Patient Instructions: Attention not to aggravate affected area (Ice and elevate finger often. OTC ibuprofen and/or tylenol as needed for pain.)      Restrictions: Regular Duty, Discharged from Occupational Health     Return Appointment: PRN.  SB.

## 2024-03-08 NOTE — PROGRESS NOTES
Subjective:      Patient ID: Jaden Sepulveda Sr. is a 39 y.o. male.    Chief Complaint: Hand Injury (DOI: 03/07/2024)    Patient's place of employment - [employer].  Patient's job title - Plater   Date of injury - 03/07/2024  Body part injured including left or right - RT Middle Finger  Injury Mechanism - Smash   What they were doing when they got hurt - Pt was placing a part onto the pallet when the heavy metal part slipped landing on Rt hand middle finger.   What they did immediately after - Pt continued working, as the day went on noticed pressure in the finger.   Pain scale right now - 3/10  SB.    Pt states that it is mainly just pressure in the RT middle finger, no pain/discomfort to the rest of the RT hand.       Provider note:   39-year-old right-hand dominant male presents with crushing injury to right middle fingertip.  Patient was at work yesterday when he accidentally smashed the finger while handling a heavy metal object.  Patient reports swelling, minimal pain and pressure in the fingertip.  Also reports some numbness in the fingertip.  He denies previous right middle finger injury.  Patient continued to work yesterday.  He has not taken anything for pain. MEB        Constitution: Negative for activity change.   HENT:  Negative for facial trauma.    Neck: Negative for neck pain.   Cardiovascular:  Negative for chest trauma.   Eyes:  Negative for eye trauma.   Respiratory:  Negative for shortness of breath.    Gastrointestinal:  Negative for abdominal trauma.   Musculoskeletal:  Positive for joint pain, joint swelling and abnormal ROM of joint. Negative for muscle cramps and muscle ache.   Skin:  Positive for bruising. Negative for wound.   Neurological:  Positive for numbness and tingling.     Objective:     Physical Exam  Vitals and nursing note reviewed.   Constitutional:       General: He is not in acute distress.     Appearance: He is well-developed. He is not diaphoretic.   HENT:       Head: Normocephalic and atraumatic.      Right Ear: Hearing and external ear normal.      Left Ear: Hearing and external ear normal.      Nose: Nose normal. No nasal deformity.   Eyes:      General: Lids are normal. No scleral icterus.     Conjunctiva/sclera: Conjunctivae normal.   Neck:      Trachea: Trachea normal.   Cardiovascular:      Pulses: Normal pulses.   Pulmonary:      Effort: Pulmonary effort is normal. No respiratory distress.      Breath sounds: No stridor.   Musculoskeletal:      Cervical back: Normal range of motion.   Skin:     General: Skin is warm and dry.      Capillary Refill: Capillary refill takes less than 2 seconds.   Neurological:      Mental Status: He is alert. He is not disoriented.      GCS: GCS eye subscore is 4. GCS verbal subscore is 5. GCS motor subscore is 6.      Sensory: No sensory deficit.   Psychiatric:         Attention and Perception: He is attentive.         Speech: Speech normal.         Behavior: Behavior normal.        X-Ray Finger 2 or More Views    Result Date: 3/8/2024  EXAMINATION: XR FINGER 2 OR MORE VIEWS CLINICAL HISTORY: TRAUMA;  Crushing injury of right middle finger, initial encounter TECHNIQUE: Three images of the right 3rd finger. COMPARISON: None FINDINGS: Acute nondisplaced tuft fracture of the distal phalanx of the 3rd finger.  No other findings to suggest fractures with preserved bone density and joint spaces.     Nondisplaced 3rd finger tuft fracture. Electronically signed by: Jayce Fernando Date:    03/08/2024 Time:    11:13       Assessment:      1. Closed nondisplaced fracture of distal phalanx of right middle finger, initial encounter    2. Encounter related to worker's compensation claim    3. Crushing injury of right middle finger, initial encounter    4. Work related injury      Plan:     It is with a high degree of medical certainty that this patient's current signs and symptoms were caused or exacerbated by the work related injury mentioned in the  note above    Encouraged ice, elevation and OTC NSAIDs or Tylenol as needed for pain.  I offered a finger guard, which patient declined.  Discussed usual course of healing will take 4-6 weeks.  He may return to clinic as needed.  Patient verbalized understanding and agreement.     Patient Instructions: Attention not to aggravate affected area (Ice and elevate finger often. OTC ibuprofen and/or tylenol as needed for pain.)   Restrictions: Regular Duty, Discharged from Occupational Health  Follow up if symptoms worsen or fail to improve.

## 2024-06-10 ENCOUNTER — PATIENT MESSAGE (OUTPATIENT)
Dept: INTERNAL MEDICINE | Facility: CLINIC | Age: 39
End: 2024-06-10
Payer: COMMERCIAL

## 2024-06-25 DIAGNOSIS — E03.9 ACQUIRED HYPOTHYROIDISM: ICD-10-CM

## 2024-06-25 RX ORDER — LEVOTHYROXINE SODIUM 75 UG/1
TABLET ORAL
Qty: 90 TABLET | Refills: 3 | Status: SHIPPED | OUTPATIENT
Start: 2024-06-25

## 2025-01-26 NOTE — PROGRESS NOTES
"PATIENT: Jaden Sepulveda .  MRN: [id number]  DATE: 1/30/2025    Diagnosis:   1. History of Hodgkin lymphoma    2. Lymphoma in remission    3. 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 Sepulveda is a 39 y.o. male who presented in May 2022 for evaluation and management of Hodgkin lymphoma.    - he had previously seen Dr. Amador. Information per his note dated 5/11/21:  "h/o HL treated in 2012.  INTERVAL 5/11/2021     Dr. Mcintyre's onc hx copied from previous note  This 35 y/o  male noted a left supraclavicular mass in 2012. The mass grew larger and he was sent by his PCP to a aurgeon who biopsied the node. It was nodular sclerosing Hodgkins" . Bilateral bone marrows were negative . However he had adenopathy in the left cervical, mediastinal , axillary, left inguinal, femoral nodes and bilateral lung nodules. The patient had 6 months (12 treatments ) of AVBD completing all in 10/2012. Subsequent scans showed the lung nodules were not pet avid but remained and no remaining adenopathy .  Abdominal and pelvic ultrasound and chest xray 4/2019 were negative"    Interval history:  - he presents for a follow-up appointment for his history of lymphoma.  - today, he is doing well. He denies shortness of breath, chest pain, nausea, vomiting, diarrhea, constipation. No fever, unintentional weight loss, night sweats.    Past medical, surgical, family, and social histories have been reviewed and updated below.    Past Medical History:   Past Medical History:   Diagnosis Date    Pityriasis in adult 04/02/2019       Past Surgical History: No past surgical history " on file.    Family History:   Family History   Problem Relation Name Age of Onset    Arthritis Mother Samantha Sepulveda     Heart disease Father William Sepulveda     Early death Maternal Grandfather Parth Lane     Early death Maternal Grandmother Chasidy Lane     Early death Paternal Grandfather Wojciech Sepulveda     Diabetes Maternal Aunt Rafaela Lane        Social History:  reports that he has never smoked. He has never used smokeless tobacco. He reports that he does not currently use alcohol. He reports that he does not currently use drugs.    Allergies:  Review of patient's allergies indicates:  No Known Allergies    Medications:  Current Outpatient Medications   Medication Sig Dispense Refill    ketoconazole (NIZORAL) 2 % cream Apply topically once daily. for 14 days 60 g 1    levothyroxine (SYNTHROID) 75 MCG tablet Take 1 tablet by mouth once daily 90 tablet 3     No current facility-administered medications for this visit.       Review of Systems   Constitutional:  Negative for appetite change and unexpected weight change.   HENT:  Negative for mouth sores.    Eyes:  Negative for visual disturbance.   Respiratory:  Negative for cough and shortness of breath.    Cardiovascular:  Negative for chest pain.   Gastrointestinal:  Negative for abdominal pain and diarrhea.   Genitourinary:  Negative for frequency.   Musculoskeletal:  Negative for back pain.   Skin:  Negative for rash.   Neurological:  Negative for headaches.   Hematological:  Negative for adenopathy.   Psychiatric/Behavioral:  The patient is not nervous/anxious.        ECOG Performance Status:   ECOG SCORE    0 - Fully active-able to carry on all pre-disease performance without restriction         Objective:      Vitals:   Vitals:    01/30/25 1435   BP: 130/64   BP Location: Right arm   Patient Position: Sitting   Pulse: 64   SpO2: 98%   Weight: 102.5 kg (225 lb 15.5 oz)       BMI: Body mass index is 28.24 kg/m².    Physical Exam  Vitals and  "nursing note reviewed.   Constitutional:       Appearance: He is well-developed.   HENT:      Head: Normocephalic and atraumatic.   Eyes:      Pupils: Pupils are equal, round, and reactive to light.   Cardiovascular:      Rate and Rhythm: Normal rate and regular rhythm.   Pulmonary:      Effort: Pulmonary effort is normal.      Breath sounds: Normal breath sounds.   Abdominal:      General: Bowel sounds are normal.      Palpations: Abdomen is soft.   Musculoskeletal:         General: Normal range of motion.      Cervical back: Normal range of motion and neck supple.   Lymphadenopathy:      Cervical: No cervical adenopathy.      Upper Body:      Right upper body: No supraclavicular or axillary adenopathy.      Left upper body: No supraclavicular or axillary adenopathy.   Skin:     General: Skin is warm and dry.   Neurological:      Mental Status: He is alert and oriented to person, place, and time.   Psychiatric:         Behavior: Behavior normal.         Thought Content: Thought content normal.         Judgment: Judgment normal.       Laboratory Data:  Labs have been reviewed.    Lab Results   Component Value Date    WBC 6.0 04/27/2022    HGB 13.9 04/27/2022    HCT 43.8 04/27/2022    MCV 85.9 04/27/2022     04/27/2022           Imaging:    Assessment:       1. History of Hodgkin lymphoma    2. Lymphoma in remission    3. History of chemotherapy           Plan:     1. History of Hodgkin lymphoma  - I have reviewed his outside records.  - diagnosed in 2012 from a left supraclavicular mass. Pathology revealed nodular sclerosing Hodgkins.  - bone marrow biopsy was negative.  - imaging revealed "in the left cervical, mediastinal , axillary, left inguinal, femoral nodes and bilateral lung nodules."  - he completed 12 treatments of ABVD chemotherapy. He completed therapy in October 2012.  - follow-up scans were negative for adenopathy. Pulmonary nodules remained but were not PET-avid.        - clinically, there is " no evidence for recurrent lymphoma  - I offered to repeat echocardiogram, but given lack of clinical symptoms, we will hold off for now.  - return to clinic in one year.    2. Advance Care Planning     Power of   After our discussion (at previous visit), the patient decided to complete a HCPOA and appointed his  wife Meghan Sepulveda ([phone]) .           - return to clinic in one year.      Brian Whittington M.D.  Hematology/Oncology  [facility] - [address], [address]  [place] [address]  Phone: [phone]  Fax: [phone]

## 2025-01-30 ENCOUNTER — OFFICE VISIT (OUTPATIENT)
Dept: HEMATOLOGY/ONCOLOGY | Facility: CLINIC | Age: 40
End: 2025-01-30
Payer: COMMERCIAL

## 2025-01-30 VITALS
OXYGEN SATURATION: 98 % | HEART RATE: 64 BPM | DIASTOLIC BLOOD PRESSURE: 64 MMHG | BODY MASS INDEX: 28.24 KG/M2 | WEIGHT: 226 LBS | SYSTOLIC BLOOD PRESSURE: 130 MMHG

## 2025-01-30 DIAGNOSIS — Z92.21 HISTORY OF CHEMOTHERAPY: ICD-10-CM

## 2025-01-30 DIAGNOSIS — Z85.71 HISTORY OF HODGKIN LYMPHOMA: Primary | ICD-10-CM

## 2025-01-30 DIAGNOSIS — C85.9A LYMPHOMA IN REMISSION: ICD-10-CM

## 2025-01-30 PROBLEM — C81.90: Status: RESOLVED | Noted: 2019-04-02 | Resolved: 2025-01-30

## 2025-01-30 PROCEDURE — 1159F MED LIST DOCD IN RCRD: CPT | Mod: CPTII,S$GLB,, | Performed by: INTERNAL MEDICINE

## 2025-01-30 PROCEDURE — 1160F RVW MEDS BY RX/DR IN RCRD: CPT | Mod: CPTII,S$GLB,, | Performed by: INTERNAL MEDICINE

## 2025-01-30 PROCEDURE — 99999 PR PBB SHADOW E&M-EST. PATIENT-LVL III: CPT | Mod: PBBFAC,,, | Performed by: INTERNAL MEDICINE

## 2025-01-30 PROCEDURE — 3075F SYST BP GE 130 - 139MM HG: CPT | Mod: CPTII,S$GLB,, | Performed by: INTERNAL MEDICINE

## 2025-01-30 PROCEDURE — 99214 OFFICE O/P EST MOD 30 MIN: CPT | Mod: S$GLB,,, | Performed by: INTERNAL MEDICINE

## 2025-01-30 PROCEDURE — 3008F BODY MASS INDEX DOCD: CPT | Mod: CPTII,S$GLB,, | Performed by: INTERNAL MEDICINE

## 2025-01-30 PROCEDURE — 3078F DIAST BP <80 MM HG: CPT | Mod: CPTII,S$GLB,, | Performed by: INTERNAL MEDICINE

## 2025-05-27 DIAGNOSIS — E03.9 ACQUIRED HYPOTHYROIDISM: ICD-10-CM

## 2025-05-27 RX ORDER — LEVOTHYROXINE SODIUM 75 UG/1
75 TABLET ORAL
Qty: 90 TABLET | Refills: 0 | OUTPATIENT
Start: 2025-05-27

## 2025-07-12 DIAGNOSIS — E03.9 ACQUIRED HYPOTHYROIDISM: ICD-10-CM

## 2025-07-12 RX ORDER — LEVOTHYROXINE SODIUM 75 UG/1
75 TABLET ORAL
Qty: 90 TABLET | Refills: 3 | Status: SHIPPED | OUTPATIENT
Start: 2025-07-12

## 2025-07-18 ENCOUNTER — PATIENT MESSAGE (OUTPATIENT)
Dept: INTERNAL MEDICINE | Facility: CLINIC | Age: 40
End: 2025-07-18
Payer: COMMERCIAL

## 2025-07-18 DIAGNOSIS — Z01.89 ENCOUNTER FOR ROUTINE LABORATORY TESTING: Primary | ICD-10-CM

## 2025-08-04 ENCOUNTER — OFFICE VISIT (OUTPATIENT)
Dept: INTERNAL MEDICINE | Facility: CLINIC | Age: 40
End: 2025-08-04
Payer: COMMERCIAL

## 2025-08-04 VITALS
HEIGHT: 75 IN | BODY MASS INDEX: 26.67 KG/M2 | SYSTOLIC BLOOD PRESSURE: 110 MMHG | WEIGHT: 214.5 LBS | HEART RATE: 87 BPM | OXYGEN SATURATION: 97 % | DIASTOLIC BLOOD PRESSURE: 64 MMHG

## 2025-08-04 DIAGNOSIS — M54.2 CERVICALGIA: ICD-10-CM

## 2025-08-04 DIAGNOSIS — Z00.00 ENCOUNTER FOR ANNUAL GENERAL MEDICAL EXAMINATION WITHOUT ABNORMAL FINDINGS IN ADULT: Primary | ICD-10-CM

## 2025-08-04 DIAGNOSIS — E03.9 ACQUIRED HYPOTHYROIDISM: ICD-10-CM

## 2025-08-04 PROCEDURE — 3008F BODY MASS INDEX DOCD: CPT | Mod: CPTII,S$GLB,, | Performed by: FAMILY MEDICINE

## 2025-08-04 PROCEDURE — 3044F HG A1C LEVEL LT 7.0%: CPT | Mod: CPTII,S$GLB,, | Performed by: FAMILY MEDICINE

## 2025-08-04 PROCEDURE — 3078F DIAST BP <80 MM HG: CPT | Mod: CPTII,S$GLB,, | Performed by: FAMILY MEDICINE

## 2025-08-04 PROCEDURE — 3074F SYST BP LT 130 MM HG: CPT | Mod: CPTII,S$GLB,, | Performed by: FAMILY MEDICINE

## 2025-08-04 PROCEDURE — 1160F RVW MEDS BY RX/DR IN RCRD: CPT | Mod: CPTII,S$GLB,, | Performed by: FAMILY MEDICINE

## 2025-08-04 PROCEDURE — 99395 PREV VISIT EST AGE 18-39: CPT | Mod: S$GLB,,, | Performed by: FAMILY MEDICINE

## 2025-08-04 PROCEDURE — 99999 PR PBB SHADOW E&M-EST. PATIENT-LVL III: CPT | Mod: PBBFAC,,, | Performed by: FAMILY MEDICINE

## 2025-08-04 PROCEDURE — 1159F MED LIST DOCD IN RCRD: CPT | Mod: CPTII,S$GLB,, | Performed by: FAMILY MEDICINE

## 2025-08-04 NOTE — PROGRESS NOTES
[facility]  Annual Exam/Wellness Visit    Patient Information  Jaden Sepulveda Sr.  40 y.o. male    PCP  Joce Young MD    Reason for Visit  Annual Exam and Neck Pain    Subjective:  History of Present Illness    CHIEF COMPLAINT:  Patient presents today for annual physical exam.    He reports a history of slipped disc in the neck that occasionally flares up. Previous MRI was performed at [facility] 4-5 years ago. He experiences occasional mild tingling in the back and currently manages symptoms with ibuprofen as needed. He is not ready for immediate intervention but may consider further evaluation next year. He denies significant numbness or persistent neurological symptoms.          Health Maintenance         Date Due Completion Date    Hepatitis C Screening Never done ---    COVID-19 Vaccine (1) Never done ---    HIV Screening Never done ---    Pneumococcal Vaccines (Age 0-49) (1 of 2 - PCV) Never done ---    TETANUS VACCINE 02/27/2025 2/27/2015    Influenza Vaccine (1) 09/01/2025 2/5/2024 (Declined)    Override on 2/5/2024: Declined    Hemoglobin A1c (Diabetic Prevention Screening) 07/30/2028 7/30/2025    Lipid Panel 07/30/2030 7/30/2025    RSV Vaccine (Age 60+ and Pregnant patients) (1 - 1-dose 75+ series) 02/08/2061 ---            Review of Systems   Constitutional:  Negative for chills, fatigue and fever.   HENT:  Negative for nasal congestion, ear pain, postnasal drip and sore throat.    Eyes:  Negative for visual disturbance.   Respiratory:  Negative for cough, shortness of breath and wheezing.    Cardiovascular:  Negative for chest pain and palpitations.   Gastrointestinal:  Negative for abdominal pain, change in bowel habit, constipation, diarrhea, nausea and vomiting.   Genitourinary:  Negative for dysuria and hematuria.   Musculoskeletal:  Positive for neck pain. Negative for back pain and leg pain.   Neurological:  Negative for dizziness, numbness and  "headaches.   Hematological:  Negative for adenopathy.   Psychiatric/Behavioral:  Negative for dysphoric mood, sleep disturbance and suicidal ideas. The patient is not nervous/anxious.         Patient answers are not available for this visit.      Problem List and History  Problem List[1]  Past Medical History[2]  Past Surgical History[3]  Social History     Substance and Sexual Activity   Sexual Activity Yes    Partners: Female    Birth control/protection: OCP     Tobacco Use History[4]  Social History     Substance and Sexual Activity   Alcohol Use Not Currently     Social History     Substance and Sexual Activity   Drug Use Not Currently       Medication List  Current Medications[5]    Objective:  Vitals:    08/04/25 1347   BP: 110/64   Pulse: 87   SpO2: 97%   Weight: 97.3 kg (214 lb 8.1 oz)   Height: 6' 3" (1.905 m)       Physical Exam  Vitals reviewed.   Constitutional:       General: He is not in acute distress.     Appearance: Normal appearance. He is not ill-appearing.   HENT:      Head: Normocephalic and atraumatic.      Right Ear: Tympanic membrane, ear canal and external ear normal. There is no impacted cerumen.      Left Ear: Tympanic membrane, ear canal and external ear normal. There is no impacted cerumen.      Nose: Nose normal. No congestion or rhinorrhea.      Mouth/Throat:      Mouth: Mucous membranes are moist.      Pharynx: Oropharynx is clear. No oropharyngeal exudate or posterior oropharyngeal erythema.   Eyes:      General: No scleral icterus.        Right eye: No discharge.         Left eye: No discharge.      Conjunctiva/sclera: Conjunctivae normal.   Neck:      Thyroid: No thyroid mass, thyromegaly or thyroid tenderness.   Cardiovascular:      Rate and Rhythm: Normal rate and regular rhythm.      Pulses: Normal pulses.      Heart sounds: Normal heart sounds. No murmur heard.     No friction rub. No gallop.   Pulmonary:      Effort: Pulmonary effort is normal. No respiratory distress.      " Breath sounds: Normal breath sounds. No stridor. No wheezing, rhonchi or rales.   Abdominal:      General: Abdomen is flat. Bowel sounds are normal. There is no distension.      Palpations: Abdomen is soft. There is no mass.      Tenderness: There is no abdominal tenderness. There is no guarding.      Hernia: No hernia is present.   Musculoskeletal:         General: No swelling or deformity. Normal range of motion.      Cervical back: Normal range of motion and neck supple. No tenderness.   Lymphadenopathy:      Cervical: No cervical adenopathy.   Skin:     General: Skin is warm and dry.   Neurological:      General: No focal deficit present.      Mental Status: He is alert and oriented to person, place, and time. Mental status is at baseline.      Gait: Gait normal.      Deep Tendon Reflexes: Reflexes normal.   Psychiatric:         Mood and Affect: Mood normal.         Behavior: Behavior normal.         Thought Content: Thought content normal.         Judgment: Judgment normal.         Lab Results  CBC  Lab Results   Component Value Date    WBC 7.3 07/30/2025    RBC 5.11 07/30/2025    HGB 14.6 07/30/2025    HCT 45.6 07/30/2025    MCV 89.2 07/30/2025    MCH 28.6 07/30/2025    MCHC 32.0 07/30/2025    RDW 12.6 07/30/2025     07/30/2025    MPV 11.0 07/30/2025    LYMPH 1,825 07/30/2025    LYMPH 25.0 07/30/2025    MONO 686 07/30/2025    MONO 9.4 07/30/2025     07/30/2025    BASO 58 07/30/2025    EOSINOPHIL 5.6 07/30/2025    BASOPHIL 0.8 07/30/2025       CMP    Chemistry        Component Value Date/Time     07/30/2025 0634    K 4.1 07/30/2025 0634     07/30/2025 0634    CO2 28 07/30/2025 0634    BUN 16 07/30/2025 0634    CREATININE 0.83 07/30/2025 0634    GLU 82 07/30/2025 0634        Component Value Date/Time    CALCIUM 9.8 07/30/2025 0634    AST 19 07/30/2025 0634    ALT 26 07/30/2025 0634    BILITOT 0.4 07/30/2025 0634    ESTGFRAFRICA 130 04/27/2022 0644    EGFRNONAA 112 04/27/2022 0644     "        Lipids  Lab Results   Component Value Date    CHOL 165 07/30/2025    CHOL 164 04/27/2022      Lab Results   Component Value Date    HDL 44 07/30/2025    HDL 43 04/27/2022     Lab Results   Component Value Date    LDLCALC 101 (H) 07/30/2025    LDLCALC 99 04/27/2022      Lab Results   Component Value Date    TRIG 106 07/30/2025    TRIG 122 04/27/2022     No results found for: "TOTALCHOLEST"  Lab Results   Component Value Date    NONHDLCHOL 121 07/30/2025    NONHDLCHOL 121 04/27/2022     Lab Results   Component Value Date    CHOLHDL 3.8 07/30/2025    CHOLHDL 3.8 04/27/2022       Thyroid Function  Lab Results   Component Value Date    TSH 2.16 07/30/2025     Diabetes Screen  Lab Results   Component Value Date    HGBA1C 5.4 07/30/2025         Assessment and Plan:    ICD-10-CM ICD-9-CM   1. Encounter for annual general medical examination without abnormal findings in adult  Z00.00 V70.0   2. Acquired hypothyroidism  E03.9 244.9   3. Cervicalgia  M54.2 723.1          Assessment & Plan    - Reviewed history of slipped disc in neck with intermittent flare-ups managed with OTC medication.  - Evaluated recent lab results  - Determined no immediate intervention needed for neck issue, deferring further investigation for a future encounter      1. Encounter for annual general medical examination without abnormal findings in adult (Primary)  Health maintenance updated  Chronic issues reviewed  Fasting labs previously completed    2. Acquired hypothyroidism  Chronic, stable, controlled  Cont synthroid 75mcg, no refill needed today    3. Cervicalgia  Assessment as above  Cont conservative mgmt    --  Advised patient that normal lab and imaging results would not be communicated automatically by the provider unless otherwise stated/noted. I will be in touch if there are changes that need to be made to the treatment plan.  --    Check-Out:  Follow-Up  Follow up in about 1 year (around 8/4/2026) for Annual Visit.    Upcoming " Appointments  Future Appointments   Date Time Provider Department Center   1/29/2026  3:00 PM Brian Whittington MD [facility] HEM ONC [facility]ale[facility]         Joce Young MD, FAAFP  Family Medicine Physician  [facility]  8/4/2025      This note was generated with the assistance of ambient listening technology. Verbal consent was obtained by the patient and accompanying visitor(s) for the recording of patient appointment to facilitate this note. I attest to having reviewed and edited the generated note for accuracy, though some syntax or spelling errors may persist. Please contact the author of this note for any clarification.                                 [1]   Patient Active Problem List  Diagnosis    Acquired hypothyroidism    Cervicalgia   [2]   Past Medical History:  Diagnosis Date    Pityriasis in adult 04/02/2019   [3] No past surgical history on file.  [4]   Social History  Tobacco Use   Smoking Status Never   Smokeless Tobacco Never   [5]   Current Outpatient Medications:     levothyroxine (SYNTHROID) 75 MCG tablet, Take 1 tablet by mouth once daily, Disp: 90 tablet, Rfl: 3    ketoconazole (NIZORAL) 2 % cream, Apply topically once daily. for 14 days, Disp: 60 g, Rfl: 1

## 2025-08-05 ENCOUNTER — PATIENT OUTREACH (OUTPATIENT)
Dept: ADMINISTRATIVE | Facility: HOSPITAL | Age: 40
End: 2025-08-05
Payer: COMMERCIAL

## 2025-08-05 ENCOUNTER — TELEPHONE (OUTPATIENT)
Dept: ADMINISTRATIVE | Facility: HOSPITAL | Age: 40
End: 2025-08-05
Payer: COMMERCIAL

## 2025-08-05 NOTE — LETTER
AUTHORIZATION FOR RELEASE OF   CONFIDENTIAL INFORMATION        We are seeing Jaden Sepulveda Sr., date of birth 1985, in the clinic at [facility]. Joce Young MD is the patient's PCP. Jaden Sepulveda Sr. has an outstanding lab/procedure at the time we reviewed his chart. In order to help keep his health information updated, he has authorized us to request the following medical record(s):        ( X )  MRI (Cervical, Thoracic, Lumbar spine imaging, all imaging)      Please fax records to [facility], [place], [place], MD , [address] [place] [address]     Fax number [phone]. Email: [email or web address].    If you have any questions, please contact, Elie HERNANDEZ Care Coordinator at [phone].          Patient Name: Jaden Sepulveda Sr.  : 1985  Patient Phone #: [phone]   Jaden Sepulveda Sr.  MRN: [id number]  : 1985  Age: 39 y.o.  Sex: male         Patient/Legal Guardian Signature  This signature was collected at 2025           _______________________________   Printed Name/Relationship to Patient      Consent for Examination and Treatment: I hereby authorize the providers and employees of [facility] ([facility]) to provide medical treatment/services which includes, but is not limited to, performing and administering tests and diagnostic procedures that are deemed necessary, including, but not limited to, imaging examinations, blood tests and other laboratory procedures as may be required by the hospital, clinic, or may be ordered by my physician(s) or persons working under the general and/or special instructions of my physician(s).      I understand and agree that this consent covers all authorized persons, including but not limited to physicians, residents, nurse practitioners, physicians' assistants, specialists, consultants, student nurses, and independently contracted physicians, who are called upon by the  physician in charge, to carry out the diagnostic procedures and medical or surgical treatment.     I hereby authorize [facility] to retain or dispose of any specimens or tissue, should there be such remaining from any test or procedure.     I hereby authorize and give consent for [facility] providers and employees to take photographs, images or videotapes of such diagnostic, surgical or treatment procedures of Patient as may be required by [facility] or as may be ordered by a physician. I further acknowledge and agree that [facility] may use cameras or other devices for patient monitoring.     I am aware that the practice of medicine is not an exact science, and I acknowledge that no guarantees have been made to me as to the outcome of any tests, procedures or treatment.     Authorization for Release of Information: I understand that my insurance company and/or their agents may need information necessary to make determinations about payment/reimbursement. I hereby provide authorization to release to all insurance companies, their successors, assignees, other parties with whom they may have contracted, or others acting on their behalf, that are involved with payment for any hospital and/or clinic charges incurred by the patient, any information that they request and deem necessary for payment/reimbursement, and/or quality review.  I further authorize the release of my health information to physicians or other health care practitioners on staff who are involved in my health care now and in the future, and to other health care providers, entities, or institutions for the purpose of my continued care and treatment, including referrals.     REGISTRATION AUTHORIZATION  Form No. 34355 (Rev. 3/25/2024)    Page 1 of 3                       Medicare Patient's Certification and Authorization to Release Information and Payment Request:  I certify that the information given by me in applying for payment under Title XVIII of the Social  Security Act is correct. I authorize any conrad of medical or other information about me to release to the [facility], or its intermediaries or carriers, any information needed for this or a related Medicare claim. I request that payment of authorized benefits be made on my behalf.     Assignment of Insurance Benefits:   I hereby authorize any and all insurance companies, health plans, defined   benefit plans, health insurers or any entity that is or may be responsible for payment of my medical expenses to pay all hospital and medical benefits now due, and to become due and payable to me under any hospital benefits, sick benefits, injury benefits or any other benefit for services rendered to me, including Major Medical Benefits, direct to [facility] and all independently contracted physicians. I assign any and all rights that I may have against any and all insurance companies, health plans, defined benefit plans, health insurers or any entity that is or may be responsible for payment of my medical expenses, including, but not limited to any right to appeal a denial of a claim, any right to bring any action, lawsuit, administrative proceeding, or other cause of action on my behalf. I specifically assign my right to pursue litigation against any and all insurance companies, health plans, defined benefit plans, health insurers or any entity that is or may be responsible for payment of my medical expenses based upon a refusal to pay charges.            E. Valuables: It is understood and agreed that [facility] is not liable for the damage to or loss of any money, jewelry,   documents, dentures, eye glasses, hearing aids, prosthetics, or other property of value.     F. Computer Equipment: I understand and agree that should I choose to use computer equipment owned by [facility] or if I choose to access the Internet via [facility]s network, I do so at my own risk. [facility] is not responsible for any damage to my  computer equipment or to any damages of any type that might arise from my loss of equipment or data.     G. Acceptance of Financial Responsibility:  I agree that in consideration of the services and   supplies that have been   or will be furnished to the patient, I am hereby obligated to pay all charges made for or on the account of the patient according to the standard rates (in effect at the time the services and supplies are delivered) established by [facility], including its Patient Financial Assistance Policy to the extent it is applicable. I understand that I am responsible for all charges, or portions thereof, not covered by insurance or other sources. Patient refunds will be distributed only after balances at all [facility] facilities are paid.     H. Communication Authorization:  I hereby authorize [facility] and its representatives, along with any billing service   or  who may work on their behalf, to contact me on   my cell phone and/or home phone using pre- recorded messages, artificial voice messages, automatic telephone dialing devices or other computer assisted technology, or by electronic      mail, text messaging, or by any other form of electronic communication. This includes, but is not limited to, appointment reminders, yearly physical exam reminders, preventive care reminders, patient campaigns, welcome calls, and calls about account balances on my account or any account on which I am listed as a guarantor. I understand I have the right to opt out of these communications at any time.      Relationship  Between  Facility and  Provider:      I understand that some, but not all, providers furnishing services to the patient are not employees or agents of [facility]. The patient is under the care and supervision of his/her attending physician, and it is the responsibility of the facility and its nursing staff to carry out the instructions of such physicians. It is the responsibility of the  patient's physician/designee to obtain the patient's informed consent, when required, for medical or surgical treatment, special diagnostic or therapeutic procedures, or hospital services rendered for the patient under the special instructions of the physician/designee.           REGISTRATION AUTHORIZATION  Form No. 95599 (Rev. 3/25/2024)    Page 2 of 3                       Immunizations: [facility] shares immunization information with state sponsored health departments to help you and your doctor keep track of your immunization records. By signing, you consent to have this information shared with the health department in your state:                                [place] - LINKS ([place] Immunization Network for Kids Statewide)                                [place] - MIIX ([place] Immunization Information eXchange)                                [place] - ImmPRINT (Immunization Patient Registry with Integrated Technology)     TERM: This authorization is valid for this and subsequent care/treatment I receive at [facility] and will remain valid unless/until revoked in writing by me.     [facility]: As used in this document, [facility] means all [facility] owned and managed facilities, including, but not limited to, all health centers, surgery centers, clinics, urgent care centers, and hospitals.         [facility] complies with applicable Federal civil rights laws and does not discriminate on the basis of race, color, national origin, age, disability, or sex.  ATENCIÓN: si habla jane, tiene a de la cruz disposición servicios gratuitos de asistencia lingüística. Llame al [phone].  CHÚ Ý: N?u b?n nói Ti?ng Vi?t, có các d?ch v? h? tr? ngôn ng? mi?n phí dành cho b?n. G?i s? [phone].        REGISTRATION AUTHORIZATION  Form No. 18327 (Rev. 3/25/2024)   Page 3 of 3

## 2025-08-05 NOTE — TELEPHONE ENCOUNTER
[place] and place 3 weeks reminder to check if receive if not will send message to CMAT team to help.

## 2025-08-05 NOTE — TELEPHONE ENCOUNTER
----- Message from Joce Young MD sent at 8/4/2025  3:18 PM CDT -----  Patient reports MRI through [facility] 4-5 years ago for his neck pain. Please assist in obtaining any Cervical, Thoracic, and Lumbar spine imaging. Thanks!

## 2025-08-05 NOTE — PROGRESS NOTES
HM and immunization's reviewed and updated.  Health Maintenance Due   Topic Date Due    Hepatitis C Screening  Never done    COVID-19 Vaccine (1) Never done    HIV Screening  Never done    Pneumococcal Vaccines (Age 0-49) (1 of 2 - PCV) Never done    TETANUS VACCINE  02/27/2025     IF HEALTH MAINTENANCE ALREADY DONE, PLEASE RECORDS INFORMATION.  [place] [facility] for MRI approx 4-5 years ago for his neck pain. Request any Cervical, Thoracic, and Lumbar spine imaging, place 3 weeks reminder to check if receive if not will send message to CMAT team to help.        Moraima Carrington MA  Panel Care Coordinator  [facility]  [phone]  For Joce Young MD